# Patient Record
Sex: FEMALE | Employment: UNEMPLOYED | URBAN - METROPOLITAN AREA
[De-identification: names, ages, dates, MRNs, and addresses within clinical notes are randomized per-mention and may not be internally consistent; named-entity substitution may affect disease eponyms.]

---

## 2023-11-27 ENCOUNTER — HOSPITAL ENCOUNTER (EMERGENCY)
Facility: HOSPITAL | Age: 33
Discharge: HOME | End: 2023-11-27
Attending: EMERGENCY MEDICINE

## 2023-11-27 VITALS
OXYGEN SATURATION: 99 % | HEIGHT: 64 IN | TEMPERATURE: 97.2 F | RESPIRATION RATE: 16 BRPM | DIASTOLIC BLOOD PRESSURE: 69 MMHG | SYSTOLIC BLOOD PRESSURE: 103 MMHG | HEART RATE: 71 BPM | WEIGHT: 115 LBS | BODY MASS INDEX: 19.63 KG/M2

## 2023-11-27 DIAGNOSIS — S03.00XA CLOSED DISLOCATION OF JAW, INITIAL ENCOUNTER: Primary | ICD-10-CM

## 2023-11-27 PROCEDURE — 99284 EMERGENCY DEPT VISIT MOD MDM: CPT | Performed by: EMERGENCY MEDICINE

## 2023-11-27 PROCEDURE — 21480 CLTX TMPRMAND DISLC 1ST/SBSQ: CPT | Performed by: EMERGENCY MEDICINE

## 2023-11-27 PROCEDURE — 2500000004 HC RX 250 GENERAL PHARMACY W/ HCPCS (ALT 636 FOR OP/ED): Performed by: EMERGENCY MEDICINE

## 2023-11-27 PROCEDURE — 99283 EMERGENCY DEPT VISIT LOW MDM: CPT | Mod: 25 | Performed by: EMERGENCY MEDICINE

## 2023-11-27 PROCEDURE — 96372 THER/PROPH/DIAG INJ SC/IM: CPT | Performed by: EMERGENCY MEDICINE

## 2023-11-27 RX ORDER — KETOROLAC TROMETHAMINE 30 MG/ML
30 INJECTION, SOLUTION INTRAMUSCULAR; INTRAVENOUS ONCE
Status: COMPLETED | OUTPATIENT
Start: 2023-11-27 | End: 2023-11-27

## 2023-11-27 RX ORDER — KETOROLAC TROMETHAMINE 30 MG/ML
INJECTION, SOLUTION INTRAMUSCULAR; INTRAVENOUS
Status: COMPLETED
Start: 2023-11-27 | End: 2023-11-27

## 2023-11-27 RX ADMIN — KETOROLAC TROMETHAMINE 30 MG: 30 INJECTION, SOLUTION INTRAMUSCULAR at 18:00

## 2023-11-27 RX ADMIN — KETOROLAC TROMETHAMINE 30 MG: 30 INJECTION, SOLUTION INTRAMUSCULAR; INTRAVENOUS at 18:00

## 2023-11-27 ASSESSMENT — PAIN SCALES - GENERAL: PAINLEVEL_OUTOF10: 8

## 2023-11-27 ASSESSMENT — PAIN DESCRIPTION - LOCATION: LOCATION: JAW

## 2023-11-27 ASSESSMENT — PAIN - FUNCTIONAL ASSESSMENT: PAIN_FUNCTIONAL_ASSESSMENT: 0-10

## 2023-11-27 NOTE — ED PROVIDER NOTES
HPI   Chief Complaint   Patient presents with    Jaw Pain     An hour ago at home pt yawned and jaw stuck       This is a 32-year-old  female presenting to the emergency room with complaints of jaw pain.  The patient yawned and reports that her jaw locked.  She is experiencing pain bilateral to the J.  The patient endorses having a dental procedure just a few days prior.  She is not having any fevers or cold-like symptoms.  She denies any cuts or injuries.  The patient is unable to state if her tetanus is up-to-date however the patient is an employee of .  She did not take any medications prior to arrival for pain.  She denies any chest pain, shortness of breath, dizziness, palpitations, paresthesias, focal weakness, or syncope.  She denies any dental pain or jaw swelling                          No data recorded                Patient History   No past medical history on file.  No past surgical history on file.  No family history on file.  Social History     Tobacco Use    Smoking status: Not on file    Smokeless tobacco: Not on file   Substance Use Topics    Alcohol use: Not on file    Drug use: Not on file       Physical Exam   ED Triage Vitals [11/27/23 1726]   Temp Heart Rate Resp BP   36.2 °C (97.2 °F) 71 16 103/69      SpO2 Temp Source Heart Rate Source Patient Position   99 % Tympanic Monitor Sitting      BP Location FiO2 (%)     Left arm --       Physical Exam  Vitals and nursing note reviewed.   HENT:      Head: Normocephalic and atraumatic.      Right Ear: Tympanic membrane and external ear normal.      Left Ear: Tympanic membrane and external ear normal.      Nose: Nose normal.      Mouth/Throat:      Mouth: Mucous membranes are moist.      Comments: Patient's jaw is locked at the TMJ in the open position.  She has pain with palpation of the bilateral TMJs.  No dental injury.  No focal abscess.  Eyes:      Extraocular Movements: Extraocular movements intact.      Pupils: Pupils are equal,  round, and reactive to light.   Cardiovascular:      Rate and Rhythm: Normal rate and regular rhythm.   Pulmonary:      Effort: Pulmonary effort is normal.      Breath sounds: Normal breath sounds.   Musculoskeletal:         General: Normal range of motion.      Cervical back: Normal range of motion.   Skin:     General: Skin is warm and dry.   Neurological:      General: No focal deficit present.      Mental Status: She is alert and oriented to person, place, and time.         ED Course & MDM   Diagnoses as of 11/27/23 1801   Closed dislocation of jaw, initial encounter       Medical Decision Making  Patient was seen and evaluated with the attending physician, Dr. Grajeda.  The patient has a mandibular dislocation.  The mandible was reduced by the attending physician successfully.  The patient reported discomfort in the bilateral TMJ's.  The patient was medicated with Toradol 30 mg IM.  The patient was advised to limit jaw opening and an Ace wrap was placed around the jaw.  She is to follow-up with her dentist tomorrow.  She is to use ibuprofen and apply ice to the affected areas.  She is to return if she has any worsening symptomatology.  The patient was discharged in stable condition with computer discharge instructions given.        Procedure  Procedures     YULIYA Ocampo  11/27/23 1800       BACILIO Ocampo-ANDRA  11/27/23 1801